# Patient Record
Sex: MALE | Race: OTHER | HISPANIC OR LATINO
[De-identification: names, ages, dates, MRNs, and addresses within clinical notes are randomized per-mention and may not be internally consistent; named-entity substitution may affect disease eponyms.]

---
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
## 2024-02-22 NOTE — ED PEDIATRIC TRIAGE NOTE - WEIGHT GM
Writer met with patient who verbalized want for individual therapist once he leaves the hospital. Has [employer] Insurance. Patient given resources and explained to patient these resources. Patient verbalized understanding of these resources at this time.       [id number]